# Patient Record
Sex: FEMALE | Race: WHITE | NOT HISPANIC OR LATINO | Employment: UNEMPLOYED | URBAN - METROPOLITAN AREA
[De-identification: names, ages, dates, MRNs, and addresses within clinical notes are randomized per-mention and may not be internally consistent; named-entity substitution may affect disease eponyms.]

---

## 2018-04-12 ENCOUNTER — HISTORICAL (OUTPATIENT)
Dept: RADIOLOGY | Facility: HOSPITAL | Age: 28
End: 2018-04-12

## 2018-04-12 LAB
ABS NEUT (OLG): 6.3
ALBUMIN SERPL-MCNC: 3.7 GM/DL (ref 3.4–5)
ALBUMIN/GLOB SERPL: 1 RATIO (ref 1.1–2)
ALP SERPL-CCNC: 72 UNIT/L (ref 46–116)
ALT SERPL-CCNC: 23 UNIT/L (ref 12–78)
AST SERPL-CCNC: 17 UNIT/L (ref 10–37)
BASOPHILS # BLD AUTO: 0.02 X10(3)/MCL
BASOPHILS NFR BLD AUTO: 0.2 %
BILIRUB SERPL-MCNC: 0.5 MG/DL (ref 0.2–1)
BILIRUBIN DIRECT+TOT PNL SERPL-MCNC: 0.14 MG/DL (ref 0–0.2)
BILIRUBIN DIRECT+TOT PNL SERPL-MCNC: 0.36 MG/DL
BUN SERPL-MCNC: 18 MG/DL (ref 7–18)
CALCIUM SERPL-MCNC: 9.1 MG/DL (ref 8.5–10.1)
CHLORIDE SERPL-SCNC: 104 MMOL/L (ref 98–107)
CO2 SERPL-SCNC: 28.7 MMOL/L (ref 21–32)
CREAT SERPL-MCNC: 0.78 MG/DL (ref 0.55–1.02)
EOSINOPHIL # BLD AUTO: 0.09 X10(3)/MCL
EOSINOPHIL NFR BLD AUTO: 1 %
ERYTHROCYTE [DISTWIDTH] IN BLOOD BY AUTOMATED COUNT: 13 %
GLOBULIN SER-MCNC: 3.8 GM/DL (ref 2.4–3.5)
GLUCOSE SERPL-MCNC: 81 MG/DL (ref 74–106)
HCT VFR BLD AUTO: 39.5 % (ref 34–46)
HGB BLD-MCNC: 13.2 GM/DL (ref 11.3–15.4)
IMM GRANULOCYTES # BLD AUTO: 0.01 10*3/UL (ref 0–0.1)
IMM GRANULOCYTES NFR BLD AUTO: 0.1 % (ref 0–1)
LYMPHOCYTES # BLD AUTO: 2.25 X10(3)/MCL
LYMPHOCYTES NFR BLD AUTO: 23.9 %
MCH RBC QN AUTO: 30.9 PG (ref 27–33)
MCHC RBC AUTO-ENTMCNC: 33.4 GM/DL (ref 32–35)
MCV RBC AUTO: 92.5 FL (ref 81–97)
MONOCYTES # BLD AUTO: 0.76 X10(3)/MCL
MONOCYTES NFR BLD AUTO: 8.1 %
NEUTROPHILS # BLD AUTO: 6.3 X10(3)/MCL
NEUTROPHILS NFR BLD AUTO: 66.7 %
PLATELET # BLD AUTO: 345 X10(3)/MCL (ref 151–368)
PMV BLD AUTO: 8 FL
POTASSIUM SERPL-SCNC: 4 MMOL/L (ref 3.5–5.1)
PROT SERPL-MCNC: 7.5 GM/DL (ref 6.4–8.2)
RBC # BLD AUTO: 4.27 X10(6)/MCL (ref 3.9–5)
RHEUMATOID FACT SERPL-ACNC: <10 IU/ML (ref 0–15)
SODIUM SERPL-SCNC: 139 MMOL/L (ref 136–145)
TSH SERPL-ACNC: 0.8 MIU/ML (ref 0.35–3.75)
WBC # SPEC AUTO: 9.43 X10(3)/MCL (ref 3.4–9.2)

## 2018-09-05 ENCOUNTER — HISTORICAL (OUTPATIENT)
Dept: RADIOLOGY | Facility: HOSPITAL | Age: 28
End: 2018-09-05

## 2018-09-19 ENCOUNTER — HISTORICAL (OUTPATIENT)
Dept: RADIOLOGY | Facility: HOSPITAL | Age: 28
End: 2018-09-19

## 2018-09-19 LAB
ABS NEUT (OLG): 4.43
ALBUMIN SERPL-MCNC: 3.7 GM/DL (ref 3.4–5)
ALBUMIN/GLOB SERPL: 1 RATIO (ref 1.1–2)
ALP SERPL-CCNC: 73 UNIT/L (ref 46–116)
ALT SERPL-CCNC: 30 UNIT/L (ref 12–78)
APTT PPP: 27.2 SECOND(S) (ref 24.5–32.8)
AST SERPL-CCNC: 16 UNIT/L (ref 10–37)
B-HCG SERPL QL: NEGATIVE
BASOPHILS # BLD AUTO: 0.02 X10(3)/MCL
BASOPHILS NFR BLD AUTO: 0.2 %
BILIRUB SERPL-MCNC: 0.3 MG/DL (ref 0.2–1)
BILIRUBIN DIRECT+TOT PNL SERPL-MCNC: 0.09 MG/DL (ref 0–0.2)
BILIRUBIN DIRECT+TOT PNL SERPL-MCNC: 0.21 MG/DL
BUN SERPL-MCNC: 19 MG/DL (ref 7–18)
CALCIUM SERPL-MCNC: 9.4 MG/DL (ref 8.5–10.1)
CHLORIDE SERPL-SCNC: 101 MMOL/L (ref 98–107)
CO2 SERPL-SCNC: 29.2 MMOL/L (ref 21–32)
CREAT SERPL-MCNC: 0.86 MG/DL (ref 0.55–1.02)
EOSINOPHIL # BLD AUTO: 0.15 X10(3)/MCL
EOSINOPHIL NFR BLD AUTO: 1.9 %
ERYTHROCYTE [DISTWIDTH] IN BLOOD BY AUTOMATED COUNT: 12 %
GLOBULIN SER-MCNC: 3.7 GM/DL (ref 2.4–3.5)
GLUCOSE SERPL-MCNC: 106 MG/DL (ref 74–106)
HCT VFR BLD AUTO: 39.9 % (ref 34–46)
HGB BLD-MCNC: 13.2 GM/DL (ref 11.3–15.4)
IMM GRANULOCYTES # BLD AUTO: 0.01 10*3/UL (ref 0–0.1)
IMM GRANULOCYTES NFR BLD AUTO: 0.1 % (ref 0–1)
INR PPP: 1
LYMPHOCYTES # BLD AUTO: 2.73 X10(3)/MCL
LYMPHOCYTES NFR BLD AUTO: 34 %
MCH RBC QN AUTO: 30.9 PG (ref 27–33)
MCHC RBC AUTO-ENTMCNC: 33.1 GM/DL (ref 32–35)
MCV RBC AUTO: 93.4 FL (ref 81–97)
MONOCYTES # BLD AUTO: 0.69 X10(3)/MCL
MONOCYTES NFR BLD AUTO: 8.6 %
NEUTROPHILS # BLD AUTO: 4.43 X10(3)/MCL
NEUTROPHILS NFR BLD AUTO: 55.2 %
PLATELET # BLD AUTO: 350 X10(3)/MCL (ref 151–368)
PMV BLD AUTO: 9 FL
POTASSIUM SERPL-SCNC: 4.2 MMOL/L (ref 3.5–5.1)
PROT SERPL-MCNC: 7.4 GM/DL (ref 6.4–8.2)
PROTHROMBIN TIME: 9.9 SECOND(S) (ref 9.3–11.4)
RBC # BLD AUTO: 4.27 X10(6)/MCL (ref 3.9–5)
SODIUM SERPL-SCNC: 136 MMOL/L (ref 136–145)
WBC # SPEC AUTO: 8.03 X10(3)/MCL (ref 3.4–9.2)

## 2018-09-20 ENCOUNTER — HISTORICAL (OUTPATIENT)
Dept: MEDSURG UNIT | Facility: HOSPITAL | Age: 28
End: 2018-09-20

## 2019-05-14 ENCOUNTER — HISTORICAL (OUTPATIENT)
Dept: RADIOLOGY | Facility: HOSPITAL | Age: 29
End: 2019-05-14

## 2019-06-19 ENCOUNTER — HISTORICAL (OUTPATIENT)
Dept: OCCUPATIONAL THERAPY | Facility: HOSPITAL | Age: 29
End: 2019-06-19

## 2019-07-05 ENCOUNTER — HISTORICAL (OUTPATIENT)
Dept: OCCUPATIONAL THERAPY | Facility: HOSPITAL | Age: 29
End: 2019-07-05

## 2019-07-09 ENCOUNTER — HISTORICAL (OUTPATIENT)
Dept: OCCUPATIONAL THERAPY | Facility: HOSPITAL | Age: 29
End: 2019-07-09

## 2019-07-11 ENCOUNTER — HISTORICAL (OUTPATIENT)
Dept: OCCUPATIONAL THERAPY | Facility: HOSPITAL | Age: 29
End: 2019-07-11

## 2019-07-15 ENCOUNTER — HISTORICAL (OUTPATIENT)
Dept: OCCUPATIONAL THERAPY | Facility: HOSPITAL | Age: 29
End: 2019-07-15

## 2019-07-18 ENCOUNTER — HISTORICAL (OUTPATIENT)
Dept: OCCUPATIONAL THERAPY | Facility: HOSPITAL | Age: 29
End: 2019-07-18

## 2019-07-22 ENCOUNTER — HISTORICAL (OUTPATIENT)
Dept: OCCUPATIONAL THERAPY | Facility: HOSPITAL | Age: 29
End: 2019-07-22

## 2019-07-25 ENCOUNTER — HISTORICAL (OUTPATIENT)
Dept: OCCUPATIONAL THERAPY | Facility: HOSPITAL | Age: 29
End: 2019-07-25

## 2019-08-01 ENCOUNTER — HISTORICAL (OUTPATIENT)
Dept: OCCUPATIONAL THERAPY | Facility: HOSPITAL | Age: 29
End: 2019-08-01

## 2019-08-06 ENCOUNTER — HISTORICAL (OUTPATIENT)
Dept: OCCUPATIONAL THERAPY | Facility: HOSPITAL | Age: 29
End: 2019-08-06

## 2019-08-13 ENCOUNTER — HISTORICAL (OUTPATIENT)
Dept: OCCUPATIONAL THERAPY | Facility: HOSPITAL | Age: 29
End: 2019-08-13

## 2019-08-16 ENCOUNTER — HISTORICAL (OUTPATIENT)
Dept: OCCUPATIONAL THERAPY | Facility: HOSPITAL | Age: 29
End: 2019-08-16

## 2020-01-08 ENCOUNTER — HISTORICAL (OUTPATIENT)
Dept: LAB | Facility: HOSPITAL | Age: 30
End: 2020-01-08

## 2020-01-08 LAB
ABS NEUT (OLG): 4.73
ALBUMIN SERPL-MCNC: 4.3 GM/DL (ref 3.5–5.2)
ALBUMIN/GLOB SERPL: 1.1 RATIO (ref 1.1–2)
ALP SERPL-CCNC: 66 UNIT/L (ref 40–150)
ALT SERPL-CCNC: 11 UNIT/L (ref 0–55)
AST SERPL-CCNC: 16 UNIT/L (ref 5–34)
BASOPHILS # BLD AUTO: 0.01 X10(3)/MCL
BASOPHILS NFR BLD AUTO: 0.1 %
BILIRUB SERPL-MCNC: 0.6 MG/DL
BILIRUBIN DIRECT+TOT PNL SERPL-MCNC: 0.3 MG/DL
BILIRUBIN DIRECT+TOT PNL SERPL-MCNC: 0.3 MG/DL (ref 0–0.5)
BUN SERPL-MCNC: 12 MG/DL (ref 7–18.7)
CALCIUM SERPL-MCNC: 10.3 MG/DL (ref 8.4–10.2)
CHLORIDE SERPL-SCNC: 101 MMOL/L (ref 98–107)
CO2 SERPL-SCNC: 28 MEQ/L (ref 22–29)
CREAT SERPL-MCNC: 0.81 MG/DL (ref 0.55–1.02)
DEPRECATED CALCIDIOL+CALCIFEROL SERPL-MC: 14.46 NG/ML (ref 30–80)
EOSINOPHIL # BLD AUTO: 0.03 X10(3)/MCL
EOSINOPHIL NFR BLD AUTO: 0.4 %
ERYTHROCYTE [DISTWIDTH] IN BLOOD BY AUTOMATED COUNT: 13 %
GLOBULIN SER-MCNC: 3.9 GM/DL (ref 2.4–3.5)
GLUCOSE SERPL-MCNC: 100 MG/DL (ref 74–100)
HCT VFR BLD AUTO: 43.1 % (ref 34–46)
HGB BLD-MCNC: 13.8 GM/DL (ref 11.3–15.4)
IMM GRANULOCYTES # BLD AUTO: 0.01 10*3/UL (ref 0–0.1)
IMM GRANULOCYTES NFR BLD AUTO: 0.1 % (ref 0–1)
LYMPHOCYTES # BLD AUTO: 2.29 X10(3)/MCL
LYMPHOCYTES NFR BLD AUTO: 30.1 %
MCH RBC QN AUTO: 29.9 PG (ref 27–33)
MCHC RBC AUTO-ENTMCNC: 32 GM/DL (ref 32–35)
MCV RBC AUTO: 93.3 FL (ref 81–97)
MONOCYTES # BLD AUTO: 0.54 X10(3)/MCL
MONOCYTES NFR BLD AUTO: 7.1 %
NEUTROPHILS # BLD AUTO: 4.73 X10(3)/MCL
NEUTROPHILS NFR BLD AUTO: 62.2 %
PLATELET # BLD AUTO: 410 X10(3)/MCL (ref 140–450)
PMV BLD AUTO: 9 FL
POTASSIUM SERPL-SCNC: 4.1 MMOL/L (ref 3.5–5.1)
PROT SERPL-MCNC: 8.2 GM/DL (ref 6.4–8.3)
RBC # BLD AUTO: 4.62 X10(6)/MCL (ref 3.9–5)
SODIUM SERPL-SCNC: 139 MMOL/L (ref 136–145)
TSH SERPL-ACNC: 0.62 UIU/ML (ref 0.35–4.94)
VIT B12 SERPL-MCNC: 381 PG/ML (ref 193–986)
WBC # SPEC AUTO: 7.61 X10(3)/MCL (ref 3.4–9.2)

## 2020-01-27 ENCOUNTER — HISTORICAL (OUTPATIENT)
Dept: LAB | Facility: HOSPITAL | Age: 30
End: 2020-01-27

## 2020-01-27 LAB
ALBUMIN SERPL-MCNC: 3.8 GM/DL (ref 3.5–5.2)
ALBUMIN/GLOB SERPL: 1.1 RATIO (ref 1.1–2)
ALP SERPL-CCNC: 54 UNIT/L (ref 40–150)
ALT SERPL-CCNC: 11 UNIT/L (ref 0–55)
AST SERPL-CCNC: 15 UNIT/L (ref 5–34)
BILIRUB SERPL-MCNC: 0.5 MG/DL
BILIRUBIN DIRECT+TOT PNL SERPL-MCNC: 0.2 MG/DL (ref 0–0.5)
BILIRUBIN DIRECT+TOT PNL SERPL-MCNC: 0.3 MG/DL
BUN SERPL-MCNC: 10 MG/DL (ref 7–18.7)
CALCIUM SERPL-MCNC: 9.5 MG/DL (ref 8.4–10.2)
CHLORIDE SERPL-SCNC: 102 MMOL/L (ref 98–107)
CO2 SERPL-SCNC: 28 MEQ/L (ref 22–29)
CREAT SERPL-MCNC: 0.75 MG/DL (ref 0.55–1.02)
GLOBULIN SER-MCNC: 3.4 GM/DL (ref 2.4–3.5)
GLUCOSE SERPL-MCNC: 87 MG/DL (ref 74–100)
POTASSIUM SERPL-SCNC: 4.2 MMOL/L (ref 3.5–5.1)
PROT SERPL-MCNC: 7.2 GM/DL (ref 6.4–8.3)
SODIUM SERPL-SCNC: 138 MMOL/L (ref 136–145)

## 2020-02-27 ENCOUNTER — HISTORICAL (OUTPATIENT)
Dept: RADIOLOGY | Facility: HOSPITAL | Age: 30
End: 2020-02-27

## 2022-04-10 ENCOUNTER — HISTORICAL (OUTPATIENT)
Dept: ADMINISTRATIVE | Facility: HOSPITAL | Age: 32
End: 2022-04-10

## 2022-04-26 VITALS
OXYGEN SATURATION: 97 % | SYSTOLIC BLOOD PRESSURE: 102 MMHG | WEIGHT: 155.63 LBS | HEIGHT: 66 IN | BODY MASS INDEX: 25.01 KG/M2 | DIASTOLIC BLOOD PRESSURE: 60 MMHG

## 2022-04-30 NOTE — H&P
SOCIAL SECURITY #:      :  1990    CHIEF COMPLAINT:  Pain of the wrist and numbness of the fingers of the right hand.    HISTORY OF PRESENT ILLNESS:  This is a 28-year-old female patient who was seen and evaluated by Dr. Krystle Rivera and worked up and she has obtained EMG nerve conduction studies which show patient has carpal tunnel syndrome of the right side.  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  She takes diclofenac and also wears splints because of the pain and discomfort.  She had been on diclofenac as well as gabapentin as well as Wellbutrin regularly.  She also has taken Robaxin and Mobic in the past.    PAST MEDICAL HISTORY:  She gives a history of bilateral tubal ligation and also had Pap smears for cervical cancer screening.    SOCIAL HISTORY:  She does not smoke.  No history of alcohol abuse.  No history of any illicit drug use.  She lives with her children and spouse.    FAMILY HISTORY:  Mother has a history of cervical cancer.    REVIEW OF SYSTEMS:  RESPIRATORY:  No shortness of breath.  CARDIOVASCULAR:  No heart murmur.  NEUROLOGIC:  No seizures.  Patient has numbness of the fingers from carpal tunnel syndrome.  GASTROINTESTINAL:  No abdominal pain.  ENDOCRINE:  No diabetes mellitus.  MUSCULOSKELETAL:  Patient has no joint problems.  GENITOURINARY:  No complaints.  HEMATOLOGIC:  No bleeding tendencies.  PSYCHIATRIC:  No complaints.  GENERAL:  Patient has symptoms of carpal tunnel syndrome on the right side.  Has minimal symptoms on the left side also.    PHYSICAL EXAMINATION:  GENERAL:  Condition of the patient is satisfactory.   VITAL SIGNS:  Stable.   HEENT:  No scalp lesion.  Oral cavity and throat normal.   NECK:  Supple.  Thyroid; no nodules.  No cervical lymphadenopathy.  Carotid pulses well palpable.   CHEST/LUNGS:  Air entry equal on both sides.  No rales or  wheezing.   HEART:  Regular.  No murmur.     ABDOMEN:  Soft.  No tenderness.  No masses palpable.   EXTREMITIES:  Femoral, popliteal and pedal pulses normal.  No pedal edema.  Upper extremities:  Right hand, patient has finger movements normal.  There are classical signs of carpal tunnel syndrome present with Tinel's and Phalen's signs positive.  Left hand has minimal symptoms and signs.     SPINE:  Normal.   NEUROLOGIC:  Status normal.    CLINICAL IMPRESSION:  Carpal tunnel syndrome, right side.    PLAN:  The patient is scheduled for release of carpal tunnel.        ______________________________  MD LILY Kuhn/CHAYA  DD:  09/20/2018  Time:  04:19AM  DT:  09/20/2018  Time:  04:36AM  Job #:  [id number]    The H&P was reviewed, the patient was examined, and the following changes to the patients   condition are noted:  _____________________________________________________________________________  ______________________________________________________________________________  ______________________________________________________________________________  [  ]  No changes to the patient's condition:      ______________________________                                             ___________________  PHYSICIAN SIGNATURE                                                             DATE/TIME    cc: MD Krystle Kuhn Dr.

## 2022-04-30 NOTE — OP NOTE
DATE OF SURGERY:    09/20/2018    SURGEON:  Manuel Chapman MD    PREOPERATIVE DIAGNOSIS:  Carpal tunnel syndrome, right side.    POSTOPERATIVE DIAGNOSIS:  Carpal tunnel syndrome, right side.    OPERATION PERFORMED:  Release of carpal tunnel right side.    ANESTHESIA:  [place] block with IV sedation.    PROCEDURE IN DETAIL:  This 28-year-old female patient was brought to the operating room, placed in the supine position.  IV sedation was given subsequently.  [place] block was given to the right upper limbs.  The right forearm and hand were prepared and draped in the usual fashion on the volar aspect of the right wrist, a vertical incision was made.  Skin, subcutaneous layers incised.  The deep fascia identified, opened.  Following this, a transverse carpal ligament was identified.  This was transected without injuring the median nerve and tendons.  Median nerve was completely released all the way superiorly and inferiorly.  Hemostasis was obtained with diathermy.  Subcutaneous plane reapproximated with 2-0 chromic gut interrupted sutures.  Skin approximated with 3-0 nylon interrupted sutures.  Area cleaned and dressing applied.  Tourniquet was slowly released.  She received a gram of Ancef prior to the procedure.  She was also sent to the recovery room in good condition.        ______________________________  MD LILY Kuhn/SD  DD:  09/20/2018  Time:  06:01PM  DT:  09/20/2018  Time:  06:47PM  Job #:  [id number]    cc: MD Dr. Krystle Kuhn

## 2022-10-15 ENCOUNTER — HOSPITAL ENCOUNTER (EMERGENCY)
Facility: HOSPITAL | Age: 32
Discharge: HOME | End: 2022-10-15
Attending: STUDENT IN AN ORGANIZED HEALTH CARE EDUCATION/TRAINING PROGRAM
Payer: MEDICAID

## 2022-10-15 VITALS
HEIGHT: 66 IN | SYSTOLIC BLOOD PRESSURE: 125 MMHG | WEIGHT: 135 LBS | RESPIRATION RATE: 18 BRPM | BODY MASS INDEX: 21.69 KG/M2 | HEART RATE: 67 BPM | DIASTOLIC BLOOD PRESSURE: 66 MMHG | OXYGEN SATURATION: 99 % | TEMPERATURE: 98 F

## 2022-10-15 DIAGNOSIS — S39.012A STRAIN OF LUMBAR REGION, INITIAL ENCOUNTER: Primary | ICD-10-CM

## 2022-10-15 PROCEDURE — 99284 EMERGENCY DEPT VISIT MOD MDM: CPT

## 2022-10-15 PROCEDURE — 63600175 PHARM REV CODE 636 W HCPCS: Performed by: STUDENT IN AN ORGANIZED HEALTH CARE EDUCATION/TRAINING PROGRAM

## 2022-10-15 PROCEDURE — 96372 THER/PROPH/DIAG INJ SC/IM: CPT | Performed by: STUDENT IN AN ORGANIZED HEALTH CARE EDUCATION/TRAINING PROGRAM

## 2022-10-15 PROCEDURE — 25000003 PHARM REV CODE 250: Performed by: STUDENT IN AN ORGANIZED HEALTH CARE EDUCATION/TRAINING PROGRAM

## 2022-10-15 RX ORDER — METHOCARBAMOL 500 MG/1
1000 TABLET, FILM COATED ORAL 3 TIMES DAILY
Qty: 30 TABLET | Refills: 0 | Status: SHIPPED | OUTPATIENT
Start: 2022-10-15 | End: 2022-10-20

## 2022-10-15 RX ORDER — LIDOCAINE 50 MG/G
1 PATCH TOPICAL
Status: DISCONTINUED | OUTPATIENT
Start: 2022-10-15 | End: 2022-10-15 | Stop reason: HOSPADM

## 2022-10-15 RX ORDER — IBUPROFEN 600 MG/1
600 TABLET ORAL EVERY 6 HOURS PRN
Qty: 20 TABLET | Refills: 0 | Status: SHIPPED | OUTPATIENT
Start: 2022-10-15

## 2022-10-15 RX ORDER — LIDOCAINE 50 MG/G
1 PATCH TOPICAL DAILY
Qty: 5 PATCH | Refills: 0 | Status: SHIPPED | OUTPATIENT
Start: 2022-10-15

## 2022-10-15 RX ORDER — METHOCARBAMOL 500 MG/1
1000 TABLET, FILM COATED ORAL
Status: COMPLETED | OUTPATIENT
Start: 2022-10-15 | End: 2022-10-15

## 2022-10-15 RX ORDER — KETOROLAC TROMETHAMINE 30 MG/ML
30 INJECTION, SOLUTION INTRAMUSCULAR; INTRAVENOUS
Status: COMPLETED | OUTPATIENT
Start: 2022-10-15 | End: 2022-10-15

## 2022-10-15 RX ADMIN — LIDOCAINE 1 PATCH: 50 PATCH CUTANEOUS at 06:10

## 2022-10-15 RX ADMIN — METHOCARBAMOL 1000 MG: 500 TABLET ORAL at 06:10

## 2022-10-15 RX ADMIN — KETOROLAC TROMETHAMINE 30 MG: 30 INJECTION, SOLUTION INTRAMUSCULAR; INTRAVENOUS at 06:10
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
## 2022-10-15 NOTE — DISCHARGE INSTRUCTIONS
Return to the ER for new or worsening symptoms. Follow-up with your primary care provider as soon as possible. Do not lift anything greater than 10 lbs until your symptoms resolve. Do not drive, operate machinery, climb ladders/heights, or perform other potentially dangerous activities while taking Robaxin as it can cause drowsiness.

## 2022-11-24 ENCOUNTER — HOSPITAL ENCOUNTER (EMERGENCY)
Facility: HOSPITAL | Age: 32
Discharge: HOME | End: 2022-11-24
Attending: GENERAL PRACTICE
Payer: MEDICAID

## 2022-11-24 VITALS
HEIGHT: 66 IN | HEART RATE: 67 BPM | DIASTOLIC BLOOD PRESSURE: 58 MMHG | BODY MASS INDEX: 21.69 KG/M2 | RESPIRATION RATE: 18 BRPM | TEMPERATURE: 98 F | SYSTOLIC BLOOD PRESSURE: 114 MMHG | OXYGEN SATURATION: 99 % | WEIGHT: 135 LBS

## 2022-11-24 DIAGNOSIS — S51.812A LACERATION OF LEFT FOREARM, INITIAL ENCOUNTER: Primary | ICD-10-CM

## 2022-11-24 PROCEDURE — 12002 RPR S/N/AX/GEN/TRNK2.6-7.5CM: CPT

## 2022-11-24 PROCEDURE — 99282 EMERGENCY DEPT VISIT SF MDM: CPT | Mod: 25

## 2022-11-24 PROCEDURE — 12004 RPR S/N/AX/GEN/TRK7.6-12.5CM: CPT

## 2022-11-25 NOTE — ED PROVIDER NOTES
"Encounter Date: 11/24/2022       History     Chief Complaint   Patient presents with    Laceration     Left forearm laceration as a result of operating a food "chopper" min to no blood exiting. Wound was covered with pressure dressing prior to arrival     Left forearm laceration as a result of operating a food "chopper" min to no blood exiting. Wound was covered with pressure dressing prior to arrival    The history is provided by the patient.   Laceration   The incident occurred just prior to arrival. The laceration is located on the Left arm. The laceration is 5 cm in size. The laceration mechanism was a a clean knife. The pain is at a severity of 3/10. The pain has been Intermittent since onset. She reports no foreign bodies present. Her tetanus status is UTD.   Review of patient's allergies indicates:  No Known Allergies  No past medical history on file.  Past Surgical History:   Procedure Laterality Date    CARPAL TUNNEL RELEASE      TUBAL LIGATION       No family history on file.  Social History     Tobacco Use    Smoking status: Never    Smokeless tobacco: Never   Substance Use Topics    Alcohol use: Never    Drug use: Never     Review of Systems   Constitutional: Negative.    HENT: Negative.     Eyes: Negative.    Respiratory: Negative.     Cardiovascular: Negative.    Gastrointestinal: Negative.    Endocrine: Negative.    Genitourinary: Negative.    Musculoskeletal: Negative.    Skin:  Positive for wound.   Allergic/Immunologic: Negative.    Neurological: Negative.    Hematological: Negative.    Psychiatric/Behavioral: Negative.     All other systems reviewed and are negative.    Physical Exam     Initial Vitals [11/24/22 1942]   BP Pulse Resp Temp SpO2   (!) 114/58 67 18 98.3 °F (36.8 °C) 99 %      MAP       --         Physical Exam    Nursing note and vitals reviewed.  Constitutional: She appears well-developed and well-nourished.   HENT:   Head: Normocephalic and atraumatic.   Eyes: EOM are normal. " Pupils are equal, round, and reactive to light.   Neck: Neck supple.   Normal range of motion.  Cardiovascular:  Normal rate, regular rhythm, normal heart sounds and intact distal pulses.           Pulmonary/Chest: Breath sounds normal.   Abdominal: Abdomen is soft. Bowel sounds are normal.   Musculoskeletal:         General: Normal range of motion.      Cervical back: Normal range of motion and neck supple.     Neurological: She is alert and oriented to person, place, and time. She has normal strength. GCS score is 15. GCS eye subscore is 4. GCS verbal subscore is 5. GCS motor subscore is 6.   Skin: Laceration noted.        5cm superficial laceration.   Psychiatric: She has a normal mood and affect. Her behavior is normal. Judgment and thought content normal.       ED Course   Lac Repair    Date/Time: 11/24/2022 8:10 PM  Performed by: Pablo Rinaldi MD  Authorized by: Pablo Rinaldi MD     Consent:     Consent obtained:  Emergent situation    Consent given by:  Patient    Risks, benefits, and alternatives were discussed: yes      Risks discussed:  Infection and poor wound healing    Alternatives discussed:  No treatment and observation  Universal protocol:     Procedure explained and questions answered to patient or proxy's satisfaction: yes      Relevant documents present and verified: yes      Test results available: yes      Imaging studies available: yes      Required blood products, implants, devices, and special equipment available: yes      Site/side marked: yes      Immediately prior to procedure, a time out was called: yes      Patient identity confirmed:  Verbally with patient  Anesthesia:     Anesthesia method:  Local infiltration    Local anesthetic:  Lidocaine 2% WITH epi  Laceration details:     Location:  Shoulder/arm    Shoulder/arm location:  L lower arm    Length (cm):  4    Depth (mm):  0.5  Pre-procedure details:     Preparation:  Patient was prepped and draped in usual sterile  fashion  Exploration:     Hemostasis achieved with:  Direct pressure    Wound exploration: entire depth of wound visualized      Wound extent: no fascia violation noted      Contaminated: no    Treatment:     Area cleansed with:  Povidone-iodine    Amount of cleaning:  Standard    Irrigation solution:  Sterile saline    Visualized foreign bodies/material removed: no      Debridement:  None    Undermining:  None    Scar revision: no    Skin repair:     Repair method:  Sutures    Suture size:  3-0    Suture material:  Prolene    Suture technique:  Running  Approximation:     Approximation:  Close  Repair type:     Repair type:  Simple  Post-procedure details:     Dressing:  Sterile dressing, antibiotic ointment and non-adherent dressing    Procedure completion:  Tolerated well, no immediate complications  Labs Reviewed - No data to display       Imaging Results    None          Medications - No data to display  Medical Decision Making:   ED Management:  Simple laceration. Running sutures used                        Clinical Impression:   Final diagnoses:  [S51.863U] Laceration of left forearm, initial encounter (Primary)      ED Disposition Condition    Discharge Stable          ED Prescriptions    None       Follow-up Information       Follow up With Specialties Details Why Contact Marcela Clemons NP Family Medicine In 3 days As needed, If symptoms worsen [address] [place] [address]  [phone]               Pablo Rinaldi MD  11/24/22 2014